# Patient Record
Sex: FEMALE | Race: WHITE | NOT HISPANIC OR LATINO | Employment: OTHER | URBAN - METROPOLITAN AREA
[De-identification: names, ages, dates, MRNs, and addresses within clinical notes are randomized per-mention and may not be internally consistent; named-entity substitution may affect disease eponyms.]

---

## 2023-09-25 PROBLEM — I73.9 PERIPHERAL VASCULAR DISEASE (CMS-HCC): Status: ACTIVE | Noted: 2023-09-25
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
## 2023-09-25 PROBLEM — Z72.0 TOBACCO ABUSE: Status: ACTIVE | Noted: 2023-09-25

## 2023-09-25 PROBLEM — G56.03 BILATERAL CARPAL TUNNEL SYNDROME: Status: ACTIVE | Noted: 2023-09-25

## 2023-09-25 PROBLEM — Z78.0 POST-MENOPAUSAL: Status: ACTIVE | Noted: 2023-09-25

## 2023-09-25 PROBLEM — M96.1 FAILED BACK SURGICAL SYNDROME: Status: ACTIVE | Noted: 2023-09-25

## 2023-09-25 PROBLEM — R03.0 ELEVATED BLOOD PRESSURE READING WITHOUT DIAGNOSIS OF HYPERTENSION: Status: ACTIVE | Noted: 2023-09-25

## 2023-09-25 RX ORDER — ACETAMINOPHEN 325 MG/1
650 TABLET ORAL EVERY 4 HOURS PRN
COMMUNITY
Start: 2020-01-06

## 2023-09-25 RX ORDER — GABAPENTIN 100 MG/1
1 CAPSULE ORAL 3 TIMES DAILY
COMMUNITY

## 2023-09-25 RX ORDER — OXYCODONE AND ACETAMINOPHEN 5; 325 MG/1; MG/1
1 TABLET ORAL 3 TIMES DAILY PRN
COMMUNITY
Start: 2015-07-23

## 2023-09-25 RX ORDER — TRAMADOL HYDROCHLORIDE 50 MG/1
50 TABLET ORAL EVERY 8 HOURS PRN
COMMUNITY
Start: 2020-03-17

## 2023-09-25 RX ORDER — LIDOCAINE 50 MG/G
1 PATCH TOPICAL DAILY
COMMUNITY
Start: 2016-05-20

## 2023-09-25 RX ORDER — GABAPENTIN 250 MG/5ML
6 SOLUTION ORAL 3 TIMES DAILY
COMMUNITY
Start: 2020-01-06

## 2023-09-25 RX ORDER — FAMOTIDINE 20 MG/1
20 TABLET, FILM COATED ORAL 2 TIMES DAILY
COMMUNITY
Start: 2020-01-06

## 2023-09-25 RX ORDER — IBUPROFEN 200 MG
1 TABLET ORAL DAILY
COMMUNITY
Start: 2020-01-06

## 2023-09-25 RX ORDER — POLYETHYLENE GLYCOL 3350 17 G/17G
17 POWDER, FOR SOLUTION ORAL NIGHTLY
COMMUNITY
Start: 2016-07-15

## 2023-10-04 ENCOUNTER — TELEMEDICINE (OUTPATIENT)
Dept: INFECTIOUS DISEASES | Facility: CLINIC | Age: 78
End: 2023-10-04
Payer: MEDICARE

## 2023-10-04 DIAGNOSIS — M00.9 JOINT INFECTION (MULTI): Primary | ICD-10-CM

## 2023-10-04 PROCEDURE — 99213 OFFICE O/P EST LOW 20 MIN: CPT | Performed by: INTERNAL MEDICINE

## 2023-10-05 NOTE — PROGRESS NOTES
Infectious Diseases Clinic Follow-up:    Reason for Visit: No chief complaint on file.      History of Current Issue  Herminia Balderas is a 78 y.o. year old female with history of left ankle fracture and recent hospital admission for infection with hardware removal is here for a telephone visit follow-up.  I spoke to patient and also her nurse.    PAST MEDICAL HISTORY:  Past Medical History:   Diagnosis Date    Personal history of other diseases of urinary system     History of kidney disease       PAST SURGICAL HISTORY:  Past Surgical History:   Procedure Laterality Date    CERVICAL FUSION  04/09/2015    Cervical Vertebral Fusion    CT AORTA AND BILATERAL ILIOFEMORAL RUNOFF ANGIOGRAM W AND/OR WO IV CONTRAST  8/24/2023    CT AORTA AND BILATERAL ILIOFEMORAL RUNOFF ANGIOGRAM W AND/OR WO IV CONTRAST 8/24/2023 [facility] CT    LUMBAR FUSION  04/09/2015    Lumbar Vertebral Fusion    OTHER SURGICAL HISTORY  04/09/2015    Transcranial Biopsy Of Orbit    OTHER SURGICAL HISTORY  04/09/2015    Orbital Implant Left Eye    OTHER SURGICAL HISTORY  04/09/2015    Peripheral Nerve Block Wrist Median Bilateral       ALLERGIES:    Allergies   Allergen Reactions    Cyanocobalamin (Vitamin B12) Unknown    Ferrous Sulfate Unknown    Hydrocodone-Acetaminophen Unknown    Nickel Unknown    Phenytoin Sodium Extended Other     pt states it burned her mouth    Tramadol Itching       MEDICATIONS:      Current Outpatient Medications:     acetaminophen (Tylenol) 325 mg tablet, Take 2 tablets (650 mg) by mouth every 4 hours if needed., Disp: , Rfl:     famotidine (Pepcid) 20 mg tablet, Take 1 tablet (20 mg) by mouth twice a day., Disp: , Rfl:     gabapentin (Neurontin) 100 mg capsule, Take 1 capsule (100 mg) by mouth 3 times a day., Disp: , Rfl:     gabapentin 250 mg/5 mL solution, Take 6 mL (300 mg) by mouth 3 times a day., Disp: , Rfl:     lidocaine (Lidoderm) 5 % patch, Place 1 patch on the skin once daily., Disp: , Rfl:     nicotine (Nicoderm CQ)  14 mg/24 hr patch, Place 1 patch on the skin once daily., Disp: , Rfl:     oxyCODONE-acetaminophen (Percocet) 5-325 mg tablet, Take 1 tablet by mouth 3 times a day as needed., Disp: , Rfl:     polyethylene glycol (Glycolax, Miralax) 17 gram/dose powder, Take 17 g by mouth once daily at bedtime., Disp: , Rfl:     sod chloride-sodium bicarb (Ayr) nasal rinse, Administer into affected nostril(s)., Disp: , Rfl:     traMADol (Ultram) 50 mg tablet, Take 1 tablet (50 mg) by mouth every 8 hours if needed., Disp: , Rfl:     REVIEW OF SYSTEMS:  Review of Systems    PHYSICAL EXAMINATION:     There were no vitals taken for this visit.     EXAM: No physical exam performed as this is a telephone visit    DATA:  Laboratory Values and Test Results: Reviewed    Microbiology: Reviewed    Other Relevant Studies: Reviewed    Imaging Studies: Reviewed    ASSESSMENT / RECOMMENDATIONS:    #L Ankle Wound Infection with possible hardware infection  Hardware had already removed.     Plan  Patient has completed 6-week course of IV cefazolin.  Advised the nurse to remove PICC line.  wound care per Ortho service    Jeferson Woods MD

## 2024-07-30 DIAGNOSIS — F41.9 ANXIETY: Primary | ICD-10-CM
